# Patient Record
Sex: MALE
[De-identification: names, ages, dates, MRNs, and addresses within clinical notes are randomized per-mention and may not be internally consistent; named-entity substitution may affect disease eponyms.]

---

## 2017-01-25 NOTE — ER DOCUMENT REPORT
ED GI/





- General


Mode of Arrival: Ambulatory


Information source: Patient


TRAVEL OUTSIDE OF THE [place] IN LAST 30 DAYS: No





- HPI


Patient complains to provider of: Abdominal pain, Diarrhea, Vomiting


Onset: Other - 5 days ago


Location: LLQ, RUQ, RLQ


Associated symptoms: Other - see above





<KEVIN MACKAY - Last Filed: 01/26/17 02:23>





<TENZIN WITT - Last Filed: 01/26/17 04:03>





- General


Chief Complaint: Nausea/Vomiting


Stated Complaint: VOMITING/DIARRHEA


Notes: 


26 year old male with history of COPD, migraines, and seizures presents to the 

ED complaining of RUQ (more severe) and bilateral lower quadrant abdominal pain 

that started 5 days ago. Patient is additionally complaining of vomiting and 

diarrhea and states that he has been vomiting brown colored liquid and has been 

having blood in his stool. Patient states that the last time he vomited was 40 

minutes ago while in the waiting room. Patient took 4 immodium today and 

several zofran throughout the day to no relief. Patient denies any abdominal 

surgery.  (KEVIN MACKAY)





- Related Data


Allergies/Adverse Reactions: 


 





bupropion HCl [From Wellbutrin] Allergy (Verified 04/15/14 08:51)


 


Sulfa (Sulfonamide Antibiotics) Allergy (Verified 01/25/17 16:36)


 


sumatriptan [From Imitrex] Allergy (Verified 04/15/14 08:51)


 


sumatriptan succinate [From Imitrex] Allergy (Verified 04/15/14 08:51)


 











Past Medical History





- General


Information source: Patient





- Social History


Smoking Status: Never Smoker


Chew tobacco use (# tins/day): No


Frequency of alcohol use: None


Drug Abuse: Marijuana


Family History: CAD, CVA, DM, Hyperlipidemia, Hypertension, Malignancy


Patient has suicidal ideation: No


Patient has homicidal ideation: No





- Past Medical History


Cardiac Medical History: Reports: Hx Hypertension


Pulmonary Medical History: Reports: Hx Asthma, Hx COPD


Neurological Medical History: Reports: Hx Migraine, Hx Seizures





- Immunizations


Immunizations up to date: Yes


Hx Diphtheria, Pertussis, Tetanus Vaccination: Yes





<KEVIN MACKAY - Last Filed: 01/26/17 02:23>





Review of Systems





- Review of Systems


Constitutional: No symptoms reported


EENT: No symptoms reported


Cardiovascular: No symptoms reported


Respiratory: No symptoms reported


Gastrointestinal: See HPI, Abdominal pain - RUQ and bilateral lower quadrants, 

Diarrhea, Vomiting, Blood in vomit, Rectal bleeding


Genitourinary: No symptoms reported


Male Genitourinary: No symptoms reported


Musculoskeletal: No symptoms reported


Skin: No symptoms reported


Hematologic/Lymphatic: No symptoms reported


Neurological/Psychological: No symptoms reported


-: Yes All other systems reviewed and negative





<KEVIN MACKAY - Last Filed: 01/26/17 02:23>





Physical Exam





- General


General appearance: Alert


In distress: None





- HEENT


Head: Normocephalic, Atraumatic


Eyes: Normal


Extraocular movements intact: Yes


Pupils: PERRL


Mucous membranes: Dry





- Respiratory


Respiratory status: No respiratory distress


Breath sounds: Normal





- Cardiovascular


Rhythm: Regular


Heart sounds: Normal auscultation





- Abdominal


Inspection: Normal


Tenderness: Tender - LLQ tenderness to palpation





- Back


Back: Normal





- Extremities


General upper extremity: Normal inspection, Normal ROM


General lower extremity: Normal inspection, Normal ROM





- Neurological


Neuro grossly intact: Yes


Cognition: Normal


Orientation: AAOx4


Corry Coma Scale Eye Opening: Spontaneous


Carmina Coma Scale Verbal: Oriented


Corry Coma Scale Motor: Obeys Commands


Corry Coma Scale Total: 15


Speech: Normal





- Psychological


Associated symptoms: Normal affect, Normal mood





- Skin


Skin Temperature: Warm


Skin Moisture: Dry


Skin Color: Normal





<KEVIN MACKAY - Last Filed: 01/26/17 02:23>





Course





- Laboratory


Result Diagrams: 


 01/25/17 16:40





 01/25/17 16:40





<KEVIN MACKAY - Last Filed: 01/26/17 02:23>





- Laboratory


Result Diagrams: 


 01/25/17 16:40





 01/25/17 16:40





<TENZIN WITT - Last Filed: 01/26/17 04:03>





- Re-evaluation


Re-evalutation: 


01/25/17 22:30


Abdominal pain improved.  No acute findings on blood work or CT.  Patient will 

be discharged home with medication for pain and nausea.  Stable for discharge.  

Return if any worsening or concerning symptoms.


 (TENZIN WITT)





- Vital Signs


Vital signs: 


 











Temp Pulse Resp BP Pulse Ox


 


 97.2 F   84   16   145/91 H  96 


 


 01/26/17 01:21  01/26/17 01:21  01/26/17 01:21  01/26/17 01:21  01/26/17 01:21








 (KEVIN MACKAY)


 (TENZIN WITT)





- Laboratory


Laboratory results interpreted by me: 


 











  01/25/17 01/25/17 01/25/17





  16:40 16:40 16:40


 


RBC  5.75 H  


 


Hgb  17.2 H  


 


Hct  51.8 H  


 


Sodium   145.8 H 


 


Total Protein   8.5 H 


 


Urine Protein    100 H








 (KEVIN MACKAY)


 (TENZIN WITT)





Discharge





<KEVIN MACKAY - Last Filed: 01/26/17 02:23>





<TENZIN WITT - Last Filed: 01/26/17 04:03>





- Discharge


Clinical Impression: 


Abdominal pain


Qualifiers:


 Abdominal location: unspecified location Qualified Code(s): R10.9 - 

Unspecified abdominal pain





Vomiting


Qualifiers:


 Vomiting type: unspecified Vomiting Intractability: non-intractable Nausea 

presence: with nausea Qualified Code(s): R11.2 - Nausea with vomiting, 

unspecified





Diarrhea


Qualifiers:


 Diarrhea type: unspecified type Qualified Code(s): R19.7 - Diarrhea, 

unspecified





Condition: Stable


Disposition: HOME, SELF-CARE


Instructions:  Abdominal Pain ([facility]), Vomiting ([facility]), Diarrhea, Nonspecific ([facility])


Scribe Attestation: 





01/26/17 04:03


I personally performed the services described in the documentation, reviewed 

and edited the documentation which was dictated to the scribe in my presence, 

and it accurately records my words and actions. (TENZIN WITT)





Scribe Documentation





- Scribe


Written by Claudia:: Claudia Garcia, 01/25/2017 20:53


acting as scribe for :: Christina





<KEVIN MACKAY - Last Filed: 01/26/17 02:23>

## 2017-01-25 NOTE — ER DOCUMENT REPORT
ED Medical Screen (RME)





- General


Stated Complaint: VOMITING/DIARRHEA


Notes: 


25 yo male c/o nausea/vomiting/diarrhea, body aches, fatigue.  + fever 


+ hx/o HTN, took meds today but vomited shortly after


Hx/o migraines and epilepsy


TRAVEL OUTSIDE OF THE [place] IN LAST 30 DAYS: No





- Related Data


Allergies/Adverse Reactions: 


 





bupropion HCl [From Wellbutrin] Allergy (Verified 04/15/14 08:51)


 


sumatriptan [From Imitrex] Allergy (Verified 04/15/14 08:51)


 


sumatriptan succinate [From Imitrex] Allergy (Verified 04/15/14 08:51)


 











Past Medical History





- Past Medical History


Cardiac Medical History: Reports: Hx Hypertension


Pulmonary Medical History: Reports: Hx Asthma


Neurological Medical History: Reports: Hx Migraine, Hx Seizures





- Immunizations


Immunizations up to date: Yes


Hx Diphtheria, Pertussis, Tetanus Vaccination: Yes





Physical Exam





- Vital signs


Vitals: 





 











Temp Pulse Resp BP Pulse Ox


 


 97.5 F   94   18   162/105 H  100 


 


 01/25/17 16:29  01/25/17 16:29  01/25/17 16:29  01/25/17 16:29  01/25/17 16:29














Course





- Vital Signs


Vital signs: 





 











Temp Pulse Resp BP Pulse Ox


 


 97.5 F   94   18   162/105 H  100 


 


 01/25/17 16:29  01/25/17 16:29  01/25/17 16:29  01/25/17 16:29  01/25/17 16:29

## 2017-10-09 NOTE — RADIOLOGY REPORT (SQ)
EXAM DESCRIPTION:  CHEST PA/LAT



COMPLETED DATE/TIME:  10/9/2017 8:59 pm



REASON FOR STUDY:  resp distress



COMPARISON:  11/23/2014



EXAM PARAMETERS:  NUMBER OF VIEWS: two views

TECHNIQUE: Digital Frontal and Lateral radiographic views of the chest acquired.

RADIATION DOSE: NA

LIMITATIONS: none



FINDINGS:  LUNGS AND PLEURA: No opacities, masses or pneumothorax. No pleural effusion.

MEDIASTINUM AND HILAR STRUCTURES: No masses or contour abnormalities.

HEART AND VASCULAR STRUCTURES: Heart normal size.  No evidence for failure.

BONES: No acute findings.

HARDWARE: None in the chest.

OTHER: No other significant finding.



IMPRESSION:  NO SIGNIFICANT RADIOGRAPHIC FINDING IN THE CHEST.



TECHNICAL DOCUMENTATION:  JOB ID:  [id number]

 2011 [employer]- All Rights Reserved

## 2017-12-18 NOTE — ER DOCUMENT REPORT
ED Medical Screen (RME)





- General


Chief Complaint: Shortness Of Breath


Stated Complaint: WHEEZING


Time Seen by Provider: 12/18/17 13:13


Notes: 





Patient has a history of asthma.  He states for 3 days he has been using his 

nebulizer and his inhaler as well as over-the-counter medications with no 

relief.  He states he has cough congestion shortness of breath as well as right 

ear pain.


TRAVEL OUTSIDE OF THE [place] IN LAST 30 DAYS: No





- Related Data


Allergies/Adverse Reactions: 


 





bupropion HCl [From Wellbutrin] Allergy (Verified 12/18/17 12:48)


 


Sulfa (Sulfonamide Antibiotics) Allergy (Verified 12/18/17 12:48)


 


sumatriptan [From Imitrex] Allergy (Verified 12/18/17 12:48)


 


sumatriptan succinate [From Imitrex] Allergy (Verified 12/18/17 12:48)


 








Home Medications: 


 Current Home Medications





Ipratropium/Albuterol Sulfate [Combivent Respimat Inhal Spray] 8 gm IH Q8HP PRN 

12/18/17 [History]











Past Medical History





- Social History


Chew tobacco use (# tins/day): No


Frequency of alcohol use: Social


Drug Abuse: Marijuana





- Past Medical History


Cardiac Medical History: Reports: Hx Hypertension


Pulmonary Medical History: Reports: Hx Asthma, Hx COPD


Neurological Medical History: Reports: Hx Migraine, Hx Seizures


Renal/ Medical History: Denies: Hx Peritoneal Dialysis





- Immunizations


Immunizations up to date: Yes


Hx Diphtheria, Pertussis, Tetanus Vaccination: Yes





Physical Exam





- Vital signs


Vitals: 





 











Temp Pulse Resp BP Pulse Ox


 


 97.7 F   111 H  22 H  140/87 H  98 


 


 12/18/17 12:52  12/18/17 12:52  12/18/17 12:52  12/18/17 12:52  12/18/17 12:52














Course





- Vital Signs


Vital signs: 





 











Temp Pulse Resp BP Pulse Ox


 


 97.7 F   111 H  22 H  140/87 H  98 


 


 12/18/17 12:52  12/18/17 12:52  12/18/17 12:52  12/18/17 12:52  12/18/17 12:52

## 2017-12-18 NOTE — ER DOCUMENT REPORT
ED General





- General


Chief Complaint: Shortness Of Breath


Stated Complaint: WHEEZING


Time Seen by Provider: 12/18/17 13:13


Notes: 





Patient began having recurrent cough and congestion and wheezing Friday and is 

continued ever since.  He is coughing up brown phlegm and actually seeing 

diffuse specks of blood today.  Has had a low-grade fever.  No vomiting or 

diarrhea.  Non-smoker.


TRAVEL OUTSIDE OF THE [place] IN LAST 30 DAYS: No





- Related Data


Allergies/Adverse Reactions: 


 





bupropion HCl [From Wellbutrin] Allergy (Verified 12/18/17 12:48)


 


Sulfa (Sulfonamide Antibiotics) Allergy (Verified 12/18/17 12:48)


 


sumatriptan [From Imitrex] Allergy (Verified 12/18/17 12:48)


 


sumatriptan succinate [From Imitrex] Allergy (Verified 12/18/17 12:48)


 








Home Medications: 


 Current Home Medications





Ipratropium/Albuterol Sulfate [Combivent Respimat Inhal Spray] 8 gm IH Q8HP PRN 

12/18/17 [History]











Past Medical History





- Social History


Smoking Status: Never Smoker


Chew tobacco use (# tins/day): No


Frequency of alcohol use: Social


Drug Abuse: Marijuana


Family History: CAD, CVA, DM, Hyperlipidemia, Hypertension, Malignancy


Patient has suicidal ideation: No


Patient has homicidal ideation: No





- Past Medical History


Cardiac Medical History: Reports: Hx Hypertension


Pulmonary Medical History: Reports: Hx Asthma


Neurological Medical History: Reports: Hx Migraine, Hx Seizures





- Immunizations


Immunizations up to date: Yes


Hx Diphtheria, Pertussis, Tetanus Vaccination: Yes





Review of Systems





- Review of Systems


Notes: 





REVIEW OF SYSTEMS:





CONSTITUTIONAL :  Denies fever.


  


EENT:   Denies eye, ear, nose or mouth or throat pain or other symptoms.  Some 

nasal congestion, however.





CARDIOVASCULAR:  Denies chest pain.





RESPIRATORY: See HPI.





GASTROINTESTINAL:  Denies abdominal pain or nausea, vomiting, or diarrhea.





GENITOURINARY:  Denies difficulty or painful urinating, urinary frequency, 

blood in urine.





MUSCULOSKELETAL:  Denies back or neck pain.  Denies joint pain or swelling.





SKIN:   Denies rash or skin lesions.





NEUROLOGICAL:  Denies LOC or altered mental status.  Denies headache.  Denies 

sensory loss or motor deficits.





ALL OTHER SYSTEMS REVIEWED AND NEGATIVE.





Physical Exam





- Vital signs


Vitals: 


 











Temp Pulse Resp BP Pulse Ox


 


 97.7 F   111 H  22 H  140/87 H  98 


 


 12/18/17 12:52  12/18/17 12:52  12/18/17 12:52  12/18/17 12:52  12/18/17 12:52











Interpretation: Tachycardic - Minor





- Notes


Notes: 





PHYSICAL EXAMINATION:





GENERAL: Well-appearing, in no acute distress.  Vital signs essentially normal 

with slight elevation of blood pressure.





HEAD: Atraumatic, normocephalic.





EYES: Pupils equal round and reactive to light, extraocular movements intact.





ENT: oropharynx clear without exudates.  Moist mucous membranes.





NECK: Normal range of motion, supple.





LUNGS: Breath sounds clear and equal bilaterally.  No wheezes at this time.  

Patient had a nebulizer treatment when he arrived.  Was also started on 

prednisone 60 mg p.o. upon arrival.





HEART: Regular rate and rhythm without murmurs.





ABDOMEN: Soft, nontender.  No guarding or rebound.





BACK:  No tenderness throughout entire back.





EXTREMITIES: Normal range of motion without pain.  Negative Homans bilaterally.





NEUROLOGICAL:  Normal speech, normal gait.  Normal sensory, motor, and reflex 

exams.  Awake, alert, and oriented x3.  Cranial nerves normal.





PSYCH: Normal mood, normal affect.





SKIN: Warm, dry, no rashes.





Course





- Re-evaluation


Re-evalutation: 





12/18/17 17:05


Explained to the patient that he is well past the window for giving Tamiflu.  

His chest x-ray does not show any pneumonia so he does not need an antibiotic.  

I will prescribe him prednisone to take in a tapering dose starting with 50 mg 

tomorrow and 10 mg less each day thereafter.  Also been given a prescription 

for Vicodin for cough suppression.





- Vital Signs


Vital signs: 


 











Temp Pulse Resp BP Pulse Ox


 


 97.7 F   111 H  22 H  140/87 H  98 


 


 12/18/17 12:52  12/18/17 12:52  12/18/17 12:52  12/18/17 12:52  12/18/17 12:52














- Laboratory


Result Diagrams: 


 12/18/17 14:50





 12/18/17 14:50


Laboratory results interpreted by me: 


 











  12/18/17 12/18/17





  14:50 14:50


 


WBC  10.7 H 


 


Glucose   123 H











12/18/17 17:06


Positive influenza B test noted.





- Diagnostic Test


Radiology results interpreted by me: 





12/18/17 17:06


Chest x-ray is normal.





Discharge





- Discharge


Clinical Impression: 


 Influenza B





Condition: Stable


Disposition: HOME, SELF-CARE


Additional Instructions: 


Influenza 





 What are conditions that should receive medical attention?





   The development of difficulty breathing.


   Lip color changes to blue or purple.


   Persistent vomiting and unable to keep liquids down with 


     signs of dehydration such as: dizziness when standing, unable to urinate, 

or if child/infant is crying no tears are noticed.


   Is less responsive than normal or becomes confused. 





How do I decrease the spread of flu in my home?





Taking care of the sick patient at home:


   Keep the sick person in a room separate from the common areas of the house. 

Keep the "sickroom" door closed. 


   If the person with the flu needs to leave the home, they should cover their 

nose/mouth when coughing or sneezing and wear a disposable (surgical) mask if 

available. These masks may be available at your local pharmacy, medical supply 

and hardware store.


   If the sick person is in common areas of the house, have them wear a 

surgical mask.


   If possible, have the sick person use a separate bathroom that should be 

cleaned daily with a household disinfectant.


If you are the caregiver:


   Avoid being face to face with the sick adult person as much as possible.


   Try to stay at least 6 feet away and wear a disposable surgical mask when 

possible.


   When holding small children who are sick, place their chin on your shoulder 

so that they will not cough in your face.


   Wash your hands after you touch the sick person or handle their tissues and 

laundry.


   Wear a mask if you leave home, as you may be infected from taking care of 

someone and not know it yet.


   Watch yourself and others in the home for flu symptoms and contact your 

doctor if symptoms occur.  NOTE: Antiviral medication used to reduce the 

symptoms of the flu works only if taken within 48 hours, and best within 24 

hours of symptom onset.








Household Cleaning, laundry and waste disposal:


   Tissues and other disposable items used by the sick person should be thrown 

away in the trash. Wash your  hands after touching these used items. No special 

waste disposal is required.


   Keep surfaces (especially bedside tables, bathroom surfaces, and toys for 

children) clean by wiping them down with a safe household disinfectant 

according to the directions on the product label.





Per CDC advice, most people will not receive testing to confirm flu. Also based 

on the person's health history


and onset of symptoms, not all patients will receive prescriptions for 

antiviral medications. If you have questions related to this, please ask your 

healthcare provider.





 For more information, you can call the Centers for Disease Control and 

Prevention (CDC) Hotline at 8-005-LGZ-INFO


 This line is available in English and Slovak, 24 hours a day, 7 days a week. 

Or www.[employer] or www.cdc.gov








Flu-Like Illness Home Instructions:





The influenza virus infection can cause a wide rage of symptoms, including:





   Fever, cough, sore throat, body aches, headaches, chills, fatigue, with 

some patients reporting diarrhea and vomiting


   Like seasonal influenza A, H1N1 ("swine flu")in humans can vary in severity 

from mild to severe


   Severe illness with pneumonia, respiratory failure and even death is 

possible


   Certain groups might be more likely to develop a severe illness from H1N1 

infection.


   Sometimes bacterial infections may occur at the same time as or after 

infection with influenza viruses and lead to pneumonias, ear infections, or 

sinus infections.





How Flu Spreads





   The main way that influenza viruses spread is through respiratory droplets 

of coughs and sneezes. This can happen when someone with the infection coughs 

or sneezes and the particles fly through the air and land on other people and 

surfaces. If the person covers their mouth and nose with their hand but does 

not wash their hands immediately, then these germs are passed onto the next 

object that they touch.





People with Influenza A or suspected H1N1 (swine flu) who are cared for at home 

should:





   Check with their doctor about any special care that they might need if they 

are pregnant or have a health condition such as diabetes, heart disease, asthma 

or emphysema.


   Also, limit caregiver to one (if possible). Pregnant women or those with 

chronic health conditions should not take care of the flu patient unless 

necessary.


   Check with their doctor about whether or not medications are needed that 

may lessen the symptoms of the flu.


   Stay at home until 24 hours fever free without the use of fever reducing 

medication.


   Get plenty of rest and avoid other healthy people in your home.


   Drink plenty of clear liquids to keep from getting dehydrated.


   Take medications like Tylenol (Acetaminophen), Advil/Motrin/Nuprin (

Ibuprofen) or Aleve (Naproxen) for fevers and aches. All children under the age 

of 18 years of age should not take aspirin or products containing aspirin (e.g. 

Pepto Bismol), as this can cause a rare serious illness called Reye Syndrome.


   Over the counter medications for flu and colds may help, but it is very 

important to follow the package directions. Remember that the medicine may help 

the symptoms, but it will not help prevent others from getting sick if they are 

around you.


   Cover coughs and sneezes using your bent arm. Clean hands with soap and 

water or an alcohol-based hand rub often, especially after using tissues to 

cough or sneeze.


   Encourage hand washing frequently for all people living in the home!


   The sick person should not have visitors other than caregivers. Encourage 

concerned loved ones to call instead of visit.


   Avoid close contact with others-do not go to work or school while sick.





ASTHMA:





     You have been diagnosed as having asthma.  This is a condition where there 

is episodic tightness in the bronchial tubes.  Allergies, infections, and 

polluted or cold air may be contributing factors.


     Emergency treatment of a severe asthma attack may include adrenaline shots

, or bronchodilator aerosol.  You may feel lightheaded, have a decreased 

exercise tolerance and a rapid pulse for an hour or two.  Rest and get plenty 

of fluids.


     Home treatment of asthma requires bronchodilator drugs.  These can be 

administered by injection, inhalation, or by mouth.  Antibiotics and 

corticosteroids may be required for some patients.  You should avoid chemical 

fumes, dusts, pollens, and exercising in very cold or dry air. If you smoke, 

stop!!


     If you develop a fever, increased wheezing, chest pain, or severe 

shortness of breath, you should contact the doctor immediately.








STEROID MEDICATION:


     You have been given an injection of or oral medicine of the cortisone/

steroid class.  This medication is used to control inflammation or allergy.  

Apolinar t is usually only given for a short period of time, until the acute process 

subsides.


     There are usually no side effects from short-term use of cortisone-like 

medications.  Some persons feel an increased sense of well-being and are not 

sleepy at bedtime.  Long-term use of cortisone medications is best avoided, 

unless required for a severe condition.  If your condition does not remit, or 

relapses after the course of corticosteroid medication, you should consult your 

physician.








INHALED BRONCHODILATORS:


     You have received treatment(s) of and/or prescription for an inhaled 

bronchodilator -- a medication which stimulates the airways in the lung to 

dilate.  This improves the flow of air in asthma, bronchitis, and emphysema.


     These medicines have some similarity to adrenaline, and can cause similar 

side effects:  shakiness, racing heart, and a sense of nervousness.  These side 

effects decrease with time.  Contact your doctor if these side effects are 

severe.


     Do not over-use the medicine.  Too-frequent use of the inhaler may make it 

ineffective.  Call your doctor if the inhaler is not controlling your symptoms 

at the prescribed doses.





You have been prescribed Vicodin to take for the cough.


Oral Narcotic Medication


     You have been given a prescription for pain control.  This medication is a 

narcotic.  It's best taken with food, as nausea can result if taken on an empty 

stomach.


     Don't operate machinery or drive within six hours of taking this 

medication.  Do not combine this medicine with alcohol, or with any medication 

which can cause sedation (such as cold tablets or sleeping pills) unless you 

get permission from the physician.


     Narcotics tend to cause constipation.  If possible, drink plenty of fluids 

and eat a diet high in fiber and fruits.





Take over-the-counter Sudafed for the nasal and head congestion.  The dosage is 

30 mg up to 4 times a day as needed.








FOLLOW-UP CARE:


If you have been referred to a physician for follow-up care, call the physician

s office for an appointment as you were instructed or within the next two days.

  If you experience worsening or a significant change in your symptoms, notify 

the physician immediately or return to the Emergency Department at any time for 

re-evaluation.


Prescriptions: 


Hydrocodone/Acetaminophen [Norco 5-325 mg Tablet] 1 tab PO Q4HP PRN #15 tablet


 PRN Reason: Cough


Prednisone [Deltasone 10 mg Tablet] 10 mg PO ASDIR PRN #15 tablet


 PRN Reason:

## 2017-12-18 NOTE — RADIOLOGY REPORT (SQ)
EXAM DESCRIPTION:  CHEST PA/LAT



COMPLETED DATE/TIME:  12/18/2017 1:59 pm



REASON FOR STUDY:  ocugh/sob



COMPARISON:  10/9/2017



EXAM PARAMETERS:  NUMBER OF VIEWS: two views

TECHNIQUE: Digital Frontal and Lateral radiographic views of the chest acquired.

RADIATION DOSE: NA

LIMITATIONS: none



FINDINGS:  LUNGS AND PLEURA: No opacities, masses or pneumothorax. No pleural effusion.

MEDIASTINUM AND HILAR STRUCTURES: No masses or contour abnormalities.

HEART AND VASCULAR STRUCTURES: Heart normal size.  No evidence for failure.

BONES: No acute findings.

HARDWARE: None in the chest.

OTHER: No other significant finding.



IMPRESSION:  NO SIGNIFICANT RADIOGRAPHIC FINDING IN THE CHEST.



TECHNICAL DOCUMENTATION:  JOB ID:  [id number]

 2011 [employer]- All Rights Reserved

## 2018-02-13 NOTE — ER DOCUMENT REPORT
ED General





- General


Chief Complaint: Cough


Stated Complaint: COUGH


Time Seen by Provider: 02/13/18 11:19


Notes: 





27-year-old male here with complaints of congestion runny nose sore throat 

cough productive of brown sputum body aches fevers chills shortness of breath 

wheezing over the past few days.  He has been using Tylenol and his home 

nebulizer treatments with mild relief.  His mother is sick with similar 

symptoms.  Eating drinking urinating defecating per usual.  He was diagnosed 

with influenza B several months ago.


TRAVEL OUTSIDE OF THE [place] IN LAST 30 DAYS: No





- Related Data


Allergies/Adverse Reactions: 


 





bupropion HCl [From Wellbutrin] Allergy (Verified 02/13/18 10:16)


 


Sulfa (Sulfonamide Antibiotics) Allergy (Verified 02/13/18 10:16)


 


sumatriptan [From Imitrex] Allergy (Verified 02/13/18 10:16)


 


sumatriptan succinate [From Imitrex] Allergy (Verified 02/13/18 10:16)


 











Past Medical History





- Social History


Smoking Status: Never Smoker


Frequency of alcohol use: Occasional


Drug Abuse: None


Family History: CAD, CVA, DM, Hyperlipidemia, Hypertension, Malignancy


Patient has suicidal ideation: No


Patient has homicidal ideation: No





- Past Medical History


Cardiac Medical History: Reports: Hx Hypertension


Pulmonary Medical History: Reports: Hx Asthma, Hx COPD


Neurological Medical History: Reports: Hx Migraine, Hx Seizures


Renal/ Medical History: Denies: Hx Peritoneal Dialysis





- Immunizations


Immunizations up to date: Yes


Hx Diphtheria, Pertussis, Tetanus Vaccination: Yes





Review of Systems





- Review of Systems


Notes: 





See history of present illness for pertinent positive review of systems; 

otherwise all review of systems have been reviewed and are negative





Physical Exam





- Vital signs


Vitals: 





 











Temp Pulse Resp BP Pulse Ox


 


 98.7 F   128 H  24 H  120/64   100 


 


 02/13/18 10:30  02/13/18 10:30  02/13/18 10:30  02/13/18 10:30  02/13/18 10:30














- Notes


Notes: 





PHYSICAL EXAMINATION:





GENERAL: Appears as if he feels unwell but in no acute distress.





HEAD: Atraumatic, normocephalic.





EYES: Pupils equal round and reactive to light, extraocular movements intact, 

sclera anicteric, conjunctiva are normal.





ENT: nares patent, oropharynx mild erythema without tonsillar swelling or 

exudates.  Moist mucous membranes.





NECK: Normal range of motion, supple without lymphadenopathy





LUNGS: CTAB and equal.  No wheezes rales or rhonchi.





HEART: Tachycardic rate (likely secondary to recent nebulizer treatment and 

fever) and regular rhythm without murmurs





ABDOMEN: Soft, no tenderness.  No guarding, no rebound





EXTREMITIES: Normal range of motion, no pitting edema.  No cyanosis.





NEUROLOGICAL: Cranial nerves grossly intact. Normal sensory/motor exams.





PSYCH: Normal mood, normal affect.





SKIN: Warm, Dry, normal turgor, no rashes or lesions noted





Course





- Re-evaluation


Re-evalutation: 





02/13/18 11:28


MEDICAL DECISION MAKING:


Concern for upper respiratory infection, most likely viral


I suspect his tachycardia today is due to his fever and recent DuoNeb 

treatments at home


I will prescribe him prednisone Tai and Tessalon


Instructed patient on fever control with Tylenol and/or (if applicable) Motrin


Also discussed keeping hydrated with water or Gatorade/Pedialyte


Instructed follow-up PCP next day or few


Patient understands and agrees to the plan of care





- Vital Signs


Vital signs: 





 











Temp Pulse Resp BP Pulse Ox


 


 98.7 F   128 H  24 H  120/64   100 


 


 02/13/18 10:30  02/13/18 10:30  02/13/18 10:30  02/13/18 10:30  02/13/18 10:30














Discharge





- Discharge


Clinical Impression: 


 Acute URI





Condition: Good


Disposition: HOME, SELF-CARE


Additional Instructions: 


You were seen in the emergency department at [facility].  

Finished antibiotics for your cough.  Finish the prednisone for your wheezing.  

If you were given any sedating medications, be sure not to operate heavy 

machinery (example - driving) and be sure you are not too sedated to walk 

appropriately.  Please followup with your primary physician in the next few 

days for further management/evaluation.  Please return to the emergency 

department for worsening of symptoms or any symptom that you deem to be 

concerning or life-threatening.  Thank you for allowing us to be part of your 

care.


Prescriptions: 


Benzonatate [Tessalon Perle 100 mg Capsule] 100 mg PO Q8HP PRN #40 cap


 PRN Reason: 


Azithromycin [Zithromax 250 mg Tablet] 250 mg PO ASDIR PRN #6 tablet


 PRN Reason: 


Prednisone [Deltasone 20 mg Tablet] 3 tab PO DAILY 2 Days  tablet

## 2018-03-28 NOTE — RADIOLOGY REPORT (SQ)
EXAM DESCRIPTION:  CHEST PA/LAT



COMPLETED DATE/TIME:  3/28/2018 1:05 pm



REASON FOR STUDY:  cough



COMPARISON:  Two-view chest 12/18/2017, 10/9/2017, 11/23/2014



EXAM PARAMETERS:  NUMBER OF VIEWS: two views

TECHNIQUE: Digital Frontal and Lateral radiographic views of the chest acquired.

RADIATION DOSE: NA

LIMITATIONS: none



FINDINGS:  LUNGS AND PLEURA: No opacities, masses or pneumothorax. No pleural effusion.

MEDIASTINUM AND HILAR STRUCTURES: No masses or contour abnormalities.

HEART AND VASCULAR STRUCTURES: Heart normal size.  No evidence for failure.

BONES: No acute findings.

HARDWARE: None in the chest.

OTHER: No other significant finding.



IMPRESSION:  NO SIGNIFICANT RADIOGRAPHIC FINDING IN THE CHEST.



TECHNICAL DOCUMENTATION:  JOB ID:  [id number]

 2011 [employer]- All Rights Reserved



Reading location - IP/workstation name: [email or web address]

## 2018-03-28 NOTE — ER DOCUMENT REPORT
ED General





- General


Chief Complaint: Shortness Of Breath


Stated Complaint: COUGH,WHEEZING,HEADACHE


Time Seen by Provider: 03/28/18 12:41


Mode of Arrival: Ambulatory


Information source: Patient


Notes: 





Patient has a history of asthma.  Reports that he has been wheezing and having 

no relief from his inhalers at home.  He is also had a headache that is been 

diffuse and throbbing.  Nothing makes it better or worse.  Does not radiate.  

It is intermittent and moderate in intensity.  He is felt somewhat short of 

breath and felt that his throat is been tight.  He has had some coughing and 

some dark brown phlegm is been coming up.


TRAVEL OUTSIDE OF THE [place] IN LAST 30 DAYS: No





- Related Data


Allergies/Adverse Reactions: 


 





bupropion HCl [From Wellbutrin] Allergy (Verified 02/13/18 10:16)


 


Sulfa (Sulfonamide Antibiotics) Allergy (Verified 02/13/18 10:16)


 


sumatriptan [From Imitrex] Allergy (Verified 02/13/18 10:16)


 


sumatriptan succinate [From Imitrex] Allergy (Verified 02/13/18 10:16)


 











Past Medical History





- General


Information source: Patient





- Social History


Smoking Status: Never Smoker


Chew tobacco use (# tins/day): No


Frequency of alcohol use: Social


Drug Abuse: None


Family History: CAD, CVA, DM, Hyperlipidemia, Hypertension, Malignancy


Patient has suicidal ideation: No


Patient has homicidal ideation: No





- Past Medical History


Cardiac Medical History: Reports: Hx Hypertension


Pulmonary Medical History: Reports: Hx Asthma, Hx COPD


Neurological Medical History: Reports: Hx Migraine, Hx Seizures


Renal/ Medical History: Denies: Hx Peritoneal Dialysis





- Immunizations


Immunizations up to date: Yes


Hx Diphtheria, Pertussis, Tetanus Vaccination: Yes





Review of Systems





- Review of Systems


Constitutional: denies: Chills, Fever


Cardiovascular: denies: Chest pain, Palpitations


Respiratory: Cough, Short of breath


-: Yes All other systems reviewed and negative





Physical Exam





- Vital signs


Vitals: 





 











Temp Pulse Resp BP Pulse Ox


 


 97.6 F   84   16   132/91 H  96 


 


 03/28/18 12:16  03/28/18 12:16  03/28/18 12:16  03/28/18 12:16  03/28/18 12:16











Interpretation: Normal





- General


General appearance: Appears well, Alert





- HEENT


Head: Normocephalic, Atraumatic


Eyes: Normal


Pupils: PERRL





- Respiratory


Respiratory status: No respiratory distress


Chest status: Nontender


Breath sounds: Nonproductive cough, Wheezing


Chest palpation: Normal





- Cardiovascular


Rhythm: Regular


Heart sounds: Normal auscultation


Murmur: No





- Abdominal


Inspection: Normal


Distension: No distension


Bowel sounds: Normal


Tenderness: Nontender


Organomegaly: No organomegaly





- Back


Back: Normal, Nontender





- Extremities


General upper extremity: Normal inspection, Nontender, Normal color, Normal ROM

, Normal temperature


General lower extremity: Normal inspection, Nontender, Normal color, Normal ROM

, Normal temperature, Normal weight bearing.  No: Selena's sign





- Neurological


Neuro grossly intact: Yes


Cognition: Normal


Orientation: AAOx4


Carmina Coma Scale Eye Opening: Spontaneous


Carmina Coma Scale Verbal: Oriented


Carmina Coma Scale Motor: Obeys Commands


Carmina Coma Scale Total: 15


Speech: Normal


Motor strength normal: LUE, RUE, LLE, RLE


Sensory: Normal





- Psychological


Associated symptoms: Normal affect, Normal mood





- Skin


Skin Temperature: Warm


Skin Moisture: Dry


Skin Color: Normal





Course





- Re-evaluation


Re-evalutation: 





03/28/18 14:27


Patient feels better after nebs and steroids





- Vital Signs


Vital signs: 





 











Temp Pulse Resp BP Pulse Ox


 


 97.6 F   84   16   132/91 H  96 


 


 03/28/18 12:16  03/28/18 12:16  03/28/18 12:16  03/28/18 12:16  03/28/18 12:16














- Diagnostic Test


Radiology reviewed: Image reviewed, Reports reviewed - No evidence of 

infiltrate or edema





Discharge





- Discharge


Clinical Impression: 


Asthma, intermittent with acute exacerbation


Qualifiers:


 Asthma severity: mild Qualified Code(s): J45.21 - Mild intermittent asthma 

with (acute) exacerbation





Condition: Stable


Disposition: HOME, SELF-CARE


Instructions:  Asthma ([facility])


Prescriptions: 


Butalb/Acetaminophen/Caffeine [Fioricet (-40 mg) Tablet] 1 tab PO Q4HP 

PRN #30 tab


 PRN Reason: 


Prednisone 60 mg PO DAILY 5 Days #15 tablet


Forms:  Return to Work

## 2018-08-22 NOTE — EEG PRO FEE REPORT
EEG INTERPRETATION



PATIENT NAME: MENDOZA WRIGHT

MRN: [id number]      ACCT #:  [id number]   ROOM#: 

ORDER#: [id number]

DATE OF STUDY:  2018                   : 1990

REFERRING MD:  SUSANNAH EDMOND M.D.





MEDICATIONS: Zoloft, Hydrochlorothiazide, Lamictal, Clonidine







History

This is a 27 year old right handed man with a history of asthma in infancy,

COPD, migraines, hypertension, JAME with his last seizure in 2017.

This EEG was requested for seizures.









EEG Interpretation

This EEG was recorded in the awake and drowsy states.  The awake EEG is

characterized by a well organized background with a well developed and

reactive posterior dominant rhythm of 12 Hz.  Drowsiness is characterized

by slowing of the background rhythms.  Sleep was not obtained.  Photic

stimulation resulted in a good driving response.  Hyperventilation was not

performed.  There were no epileptiform abnormalities.  The EKG showed a

regular rhythm.







EEG Impression

This EEG is normal in the awake and drowsy states.





INTERPRETING PHYSICIAN: JAKE TYLER M.D.





/:  AR     DT:  2018 TT:  1336      ID:  [id number]

/:  [id number]      DD:  2018 TD:  1115     JOB:  [id number]



cc:SCOUT MEZA M.D.

>





MTDD

## 2018-08-30 NOTE — ER DOCUMENT REPORT
HPI





- HPI


Patient complains to provider of: skin burning sensation


Onset: This afternoon


Onset/Duration: Persistent


Pain Level: 3


Context: 





27-year-old male used some capsaicin cream on his back because of some low back 

pain and it started burning his skin.  He took the capsaicin off but it is 

persisted to have a burning sensation and some erythema.  He is wondering if he 

had an allergic reaction.  There is no chest pain or shortness of breath.  No 

swelling.


Associated Symptoms: None


Exacerbated by: Other - See above


Relieved by: Denies


Similar symptoms previously: No


Recently seen / treated by doctor: No





- ROS


ROS below otherwise negative: Yes


Systems Reviewed and Negative: Yes All other systems reviewed and negative





Past Medical History





- General


Information source: Patient





- Social History


Smoking Status: Unknown if Ever Smoked


Lives with: Family


Family History: CAD, CVA, DM, Hyperlipidemia, Hypertension, Malignancy





- Past Medical History


Cardiac Medical History: Reports: Hx Hypertension


Pulmonary Medical History: Reports: Hx Asthma, Hx COPD


Neurological Medical History: Reports: Hx Migraine, Hx Seizures


Renal/ Medical History: Denies: Hx Peritoneal Dialysis


Surgical Hx: Negative





- Immunizations


Immunizations up to date: Yes


Hx Diphtheria, Pertussis, Tetanus Vaccination: Yes





Vertical Provider Document





- CONSTITUTIONAL


Agree With Documented VS: Yes


Exam Limitations: No Limitations





- INFECTION CONTROL


TRAVEL OUTSIDE OF THE [place] IN LAST 30 DAYS: No





- RESPIRATORY


Respiratory: Breath Sounds Normal, No Respiratory Distress





- CARDIOVASCULAR


Cardiovascular: Regular Rate, Regular Rhythm





- NEURO


Level of Consciousness: Awake





- DERM


Notes: 





5 by 20 cm horozontal pink area of macular intact skin across upper lumbar 

spine 





Course





- Re-evaluation


Re-evalutation: 





08/31/18


I reviewed the Internet to see if there is any relief for the capsaicin and 

they said dairy products.  The only dairy product I could find to uses a 

topical soothing agent was ice cream and I placed that on his back with an ABD 

pad over it.  He stated it helped a lot.








- Vital Signs


Vital signs: 


 











Temp Pulse Resp BP Pulse Ox


 


 97.9 F   74   14   145/87 H  98 


 


 08/30/18 19:40  08/30/18 19:40  08/30/18 19:40  08/30/18 19:40  08/30/18 19:40














Discharge





- Discharge


Clinical Impression: 


 capsacin reaction





Condition: Good


Disposition: HOME, SELF-CARE


Additional Instructions: 


do not use the capsacin cream anymore


Dairy products will soothe the burn


Referrals: 


CHANA BOWMAN,  [Primary Care Provider] - Follow up as needed

## 2019-06-27 ENCOUNTER — HOSPITAL ENCOUNTER (EMERGENCY)
Dept: HOSPITAL 62 - ER | Age: 29
Discharge: HOME | End: 2019-06-27
Payer: COMMERCIAL

## 2019-06-27 VITALS — SYSTOLIC BLOOD PRESSURE: 164 MMHG | DIASTOLIC BLOOD PRESSURE: 92 MMHG

## 2019-06-27 DIAGNOSIS — I10: ICD-10-CM

## 2019-06-27 DIAGNOSIS — J44.9: ICD-10-CM

## 2019-06-27 DIAGNOSIS — Z88.2: ICD-10-CM

## 2019-06-27 DIAGNOSIS — Z79.899: ICD-10-CM

## 2019-06-27 DIAGNOSIS — Z88.6: ICD-10-CM

## 2019-06-27 DIAGNOSIS — Z88.8: ICD-10-CM

## 2019-06-27 DIAGNOSIS — R06.02: ICD-10-CM

## 2019-06-27 DIAGNOSIS — L23.6: Primary | ICD-10-CM

## 2019-06-27 DIAGNOSIS — F41.9: ICD-10-CM

## 2019-06-27 PROCEDURE — 96374 THER/PROPH/DIAG INJ IV PUSH: CPT

## 2019-06-27 PROCEDURE — 96375 TX/PRO/DX INJ NEW DRUG ADDON: CPT

## 2019-06-27 PROCEDURE — S0028 INJECTION, FAMOTIDINE, 20 MG: HCPCS

## 2019-06-27 PROCEDURE — 99283 EMERGENCY DEPT VISIT LOW MDM: CPT

## 2019-06-27 NOTE — ER DOCUMENT REPORT
ED Allergic Reaction





- General


Chief Complaint: Allergic Reaction


Stated Complaint: SHORTNESS OF BREATH


Time Seen by Provider: 06/27/19 19:05


Primary Care Provider: 


CHANA BOWMAN DO [Primary Care Provider] - Follow up as needed


Information source: Patient


TRAVEL OUTSIDE OF THE [place] IN LAST 30 DAYS: No





- HPI


Onset: Just prior to arrival


Onset/Duration: Sudden


Quality of pain: Burning


Severity: Moderate


Pain Level: 0


Identified cause: Yes


Food exposure: Pepper


Skin rash / itching: Facial, Extremities


Swelling: Face


Associated symptoms: None


Notes: 





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 

peppers.  He found one in his food he picked up with his finger then touched his

face.  He says his his face began to swell around his eye and his right arm 

began to swell and itch.  He took some Benadryl it did not help he came to the 

ER feeling short of breath and anxious.  No cough no throat symptoms no wheezing

noted speech is clear he is not drooling satting 98 200% on room air





- Related Data


Allergies/Adverse Reactions: 


                                        





bupropion HCl [From Wellbutrin] Allergy (Verified 02/13/18 10:16)


   


Sulfa (Sulfonamide Antibiotics) Allergy (Verified 02/13/18 10:16)


   


sumatriptan [From Imitrex] Allergy (Verified 02/13/18 10:16)


   


sumatriptan succinate [From Imitrex] Allergy (Verified 02/13/18 10:16)


   











Past Medical History





- Social History


Smoking Status: Never Smoker


Family History: CAD, CVA, DM, Hyperlipidemia, Hypertension, Malignancy


Patient has suicidal ideation: No


Patient has homicidal ideation: No





- Past Medical History


Cardiac Medical History: Reports: Hx Hypertension, Other - Asthma


Pulmonary Medical History: Reports: Hx Asthma, Hx COPD


Neurological Medical History: Reports: Hx Migraine, Hx Seizures


Renal/ Medical History: Denies: Hx Peritoneal Dialysis





- Immunizations


Immunizations up to date: Yes


Hx Diphtheria, Pertussis, Tetanus Vaccination: Yes





Review of Systems





- Review of Systems


Constitutional: No symptoms reported


EENT: denies: Difficulty swallowing


Cardiovascular: No symptoms reported


Respiratory: Short of breath


Gastrointestinal: No symptoms reported


Genitourinary: No symptoms reported


Skin: Rash





Physical Exam





- Vital signs


Vitals: 


                                        











Temp Pulse Resp BP Pulse Ox


 


 98.1 F   102 H  24 H  150/99 H  99 


 


 06/27/19 18:21  06/27/19 18:21  06/27/19 18:21  06/27/19 18:21  06/27/19 18:21














- Notes


Notes: 





PHYSICAL EXAMINATION:


 


GENERAL: Well-appearing, well-nourished and very anxious appearing.


 


HEAD: Atraumatic, normocephalic.


 


EYES: Pupils equal round and reactive to light, extraocular movements intact, 

sclera anicteric, conjunctiva are normal.


 


ENT: nares patent, oropharynx clear without exudates.  Moist mucous membranes 

tongue is normal speech is clear no drooling.  Handling his secretions normally.


 


NECK: Normal range of motion, supple without lymphadenopathy


 


LUNGS: Breath sounds clear to auscultation bilaterally and equal.  No wheezes 

rales or rhonchi lungs are very clear.  No wheezing good air movement.  No 

stridor..


 


HEART: Regular rate and rhythm without murmurs slightly tachycardic at 101


 


ABDOMEN: Soft, nontender, normoactive bowel sounds.  No guarding, no rebound.  

No masses appreciated.


 


EXTREMITIES: Normal range of motion, no pitting or edema.  No cyanosis.


 


NEUROLOGICAL: No focal neurological deficits. Moves all extremities 

spontaneously and on command.


 


PSYCH: Normal mood, normal affect.


 


SKIN: Warm, Dry, normal turgor, no rashes or lesions noted.  No rash noted face 

is not swollen there is no rash to the face no rash to the arm.





Course





- Re-evaluation


Re-evalutation: 





06/27/19 20:03


Patient is calm down.  He is resting comfortably.  His blood pressure is coming 

down.  He says he has difficult to control blood pressure anyways.  He was due 

for his clonidine nightly dose.  Sats are 9900% on room air.  There is no 

distress now.  I discussed with him he needs to follow-up with his family doctor

this week for recheck.


06/27/19 20:27








Blood pressure is now 164/92.  Patient is stable for discharge.  No rash no 

wheezing no shortness of breath.  All symptoms have resolved.





- Vital Signs


Vital signs: 


                                        











Temp Pulse Resp BP Pulse Ox


 


 98.1 F   102 H  24 H  150/99 H  100 


 


 06/27/19 18:21  06/27/19 18:21  06/27/19 18:21  06/27/19 18:21  06/27/19 19:09














Discharge





- Discharge


Clinical Impression: 


 Allergic reaction, Hypertension, Anxiety





Condition: Stable


Disposition: HOME, SELF-CARE


Prescriptions: 


Diphenhydramine HCl [Benadryl 50 mg Capsule] 1 cap PO Q6 PRN #20 capsule


 PRN Reason: 


Methylprednisolone [Medrol Dosepack (4 mg/Tab) 21 Tab/Dosepak] 4 mg PO ASDIR PRN

#21 tab.ds.pk


 PRN Reason: 


Ranitidine HCl [Zantac 150 mg Tablet] 150 mg PO BID #20 tablet


Referrals: 


CHANA BOWMAN DO [Primary Care Provider] - Follow up as needed